# Patient Record
Sex: MALE | Race: WHITE | NOT HISPANIC OR LATINO | URBAN - METROPOLITAN AREA
[De-identification: names, ages, dates, MRNs, and addresses within clinical notes are randomized per-mention and may not be internally consistent; named-entity substitution may affect disease eponyms.]

---

## 2022-10-20 ENCOUNTER — CLAIMS CREATED FROM THE CLAIM WINDOW (OUTPATIENT)
Dept: URBAN - METROPOLITAN AREA SURGERY CENTER 30 | Facility: SURGERY CENTER | Age: 67
End: 2022-10-20
Payer: MEDICARE

## 2022-10-20 ENCOUNTER — CLAIMS CREATED FROM THE CLAIM WINDOW (OUTPATIENT)
Dept: URBAN - METROPOLITAN AREA CLINIC 4 | Facility: CLINIC | Age: 67
End: 2022-10-20
Payer: MEDICARE

## 2022-10-20 ENCOUNTER — OFFICE VISIT (OUTPATIENT)
Dept: URBAN - METROPOLITAN AREA SURGERY CENTER 30 | Facility: SURGERY CENTER | Age: 67
End: 2022-10-20

## 2022-10-20 DIAGNOSIS — Z80.0 BROTHER AT YOUNG AGE FAMILY HISTORY OF COLON CANCER: ICD-10-CM

## 2022-10-20 DIAGNOSIS — D12.0 ADENOMA OF CECUM: ICD-10-CM

## 2022-10-20 DIAGNOSIS — D12.5 BENIGN NEOPLASM OF SIGMOID COLON: ICD-10-CM

## 2022-10-20 DIAGNOSIS — D12.5 ADENOMA OF SIGMOID COLON: ICD-10-CM

## 2022-10-20 PROCEDURE — 45385 COLONOSCOPY W/LESION REMOVAL: CPT | Performed by: INTERNAL MEDICINE

## 2022-10-20 PROCEDURE — 88305 TISSUE EXAM BY PATHOLOGIST: CPT | Performed by: PATHOLOGY

## 2022-10-20 PROCEDURE — 45380 COLONOSCOPY AND BIOPSY: CPT | Performed by: INTERNAL MEDICINE

## 2022-10-20 PROCEDURE — G8907 PT DOC NO EVENTS ON DISCHARG: HCPCS | Performed by: INTERNAL MEDICINE

## 2022-11-15 ENCOUNTER — WEB ENCOUNTER (OUTPATIENT)
Dept: URBAN - METROPOLITAN AREA CLINIC 40 | Facility: CLINIC | Age: 67
End: 2022-11-15

## 2024-01-22 ENCOUNTER — OFFICE VISIT (OUTPATIENT)
Dept: URBAN - METROPOLITAN AREA CLINIC 40 | Facility: CLINIC | Age: 69
End: 2024-01-22
Payer: MEDICARE

## 2024-01-22 VITALS
SYSTOLIC BLOOD PRESSURE: 132 MMHG | TEMPERATURE: 97 F | HEIGHT: 66 IN | DIASTOLIC BLOOD PRESSURE: 82 MMHG | WEIGHT: 176.2 LBS | HEART RATE: 84 BPM | BODY MASS INDEX: 28.32 KG/M2

## 2024-01-22 DIAGNOSIS — R13.19 ESOPHAGEAL DYSPHAGIA: ICD-10-CM

## 2024-01-22 DIAGNOSIS — K21.9 GERD WITHOUT ESOPHAGITIS: ICD-10-CM

## 2024-01-22 PROCEDURE — 99214 OFFICE O/P EST MOD 30 MIN: CPT | Performed by: INTERNAL MEDICINE

## 2024-01-22 RX ORDER — LATANOPROST 50 UG/ML
INSTILL ONE DROP INTO EACH EYE ONE TIME DAILY AT BEDTIME SOLUTION OPHTHALMIC
Qty: 7.5 UNSPECIFIED | Refills: 0 | Status: ACTIVE | COMMUNITY

## 2024-01-22 RX ORDER — DORZOLAMIDE HYDROCHLORIDE AND TIMOLOL MALEATE 20; 5 MG/ML; MG/ML
INSTILL ONE DROP INTO EACH EYE TWICE DAILY SOLUTION OPHTHALMIC
Qty: 10 UNSPECIFIED | Refills: 0 | Status: ACTIVE | COMMUNITY

## 2024-01-22 RX ORDER — MUPIROCIN 20 MG/G
OINTMENT TOPICAL
Qty: 22 GRAM | Status: ACTIVE | COMMUNITY

## 2024-01-22 RX ORDER — MELOXICAM 15 MG/1
TAKE ONE TABLET BY MOUTH ONE TIME DAILY AS NEEDED FOR PAIN TABLET ORAL
Qty: 90 UNSPECIFIED | Refills: 1 | Status: ACTIVE | COMMUNITY

## 2024-01-22 RX ORDER — ERYTHROMYCIN 5 MG/G
OINTMENT OPHTHALMIC
Qty: 3 UNSPECIFIED | Status: ON HOLD | COMMUNITY

## 2024-01-22 RX ORDER — LISINOPRIL 10 MG/1
TAKE ONE TABLET BY MOUTH ONE TIME DAILY TABLET ORAL
Qty: 90 UNSPECIFIED | Refills: 1 | Status: ACTIVE | COMMUNITY

## 2024-01-22 NOTE — PHYSICAL EXAM PSYCH:
normal mood with appropriate affect Continue Regimen: Desonide as directed prn flares Detail Level: Detailed Initiate Treatment: Cerave healing ointment Plan: If condition does not clear & stay clear will do patch testing.  Booked already, patient will cancel if he stays clear. Otc Regimen: Cerave cleanser, Cetaphil for cleansing

## 2024-01-22 NOTE — HPI-TODAY'S VISIT:
Pt describes long history of atypical reflux symptoms, months. Quality is a burning regurgition. NotedSymptoms a few months ago after he had a spinal ablation, while being put to sleep he had regurgitation and aspirated.  He was told he likely had a hiatal hernia and should see GI for evaluation.  Since that time he has had some mucus and sinus drainage.  He took over-the-counter acid reducer and it did help significantly with much less drainage.  He also describes occasional solid food dysphagia.  This seems to be worsening over the past weeks to months.  Sometimes food sticks and is uncomfortable in the lower esophagus for seconds at a time.Colonoscopy 2022 here, 2 small polyps removed, otherwise up-to-date on colonoscopy.  Not seen prior for GERD here.

## 2024-01-23 ENCOUNTER — LAB OUTSIDE AN ENCOUNTER (OUTPATIENT)
Dept: URBAN - METROPOLITAN AREA CLINIC 40 | Facility: CLINIC | Age: 69
End: 2024-01-23

## 2024-01-23 PROBLEM — 266435005: Status: ACTIVE | Noted: 2024-01-23

## 2024-03-07 ENCOUNTER — LAB (OUTPATIENT)
Dept: URBAN - METROPOLITAN AREA CLINIC 4 | Facility: CLINIC | Age: 69
End: 2024-03-07
Payer: MEDICARE

## 2024-03-07 ENCOUNTER — EGD W/ DIL (OUTPATIENT)
Dept: URBAN - METROPOLITAN AREA SURGERY CENTER 30 | Facility: SURGERY CENTER | Age: 69
End: 2024-03-07
Payer: MEDICARE

## 2024-03-07 DIAGNOSIS — K21.9 ACID REFLUX: ICD-10-CM

## 2024-03-07 DIAGNOSIS — K29.80 ACUTE DUODENITIS: ICD-10-CM

## 2024-03-07 DIAGNOSIS — K31.89 OTHER DISEASES OF STOMACH AND DUODENUM: ICD-10-CM

## 2024-03-07 DIAGNOSIS — R13.19 CERVICAL DYSPHAGIA: ICD-10-CM

## 2024-03-07 PROCEDURE — 88305 TISSUE EXAM BY PATHOLOGIST: CPT | Performed by: PATHOLOGY

## 2024-03-07 PROCEDURE — 43239 EGD BIOPSY SINGLE/MULTIPLE: CPT | Performed by: INTERNAL MEDICINE

## 2024-03-07 PROCEDURE — 88312 SPECIAL STAINS GROUP 1: CPT | Performed by: PATHOLOGY

## 2024-03-07 PROCEDURE — 43450 DILATE ESOPHAGUS 1/MULT PASS: CPT | Performed by: INTERNAL MEDICINE

## 2024-03-07 RX ORDER — PANTOPRAZOLE SODIUM 40 MG/1
1 TABLET TABLET, DELAYED RELEASE ORAL ONCE A DAY
Qty: 90 TABLET | Refills: 3 | OUTPATIENT
Start: 2024-03-07

## 2024-03-07 RX ORDER — ERYTHROMYCIN 5 MG/G
OINTMENT OPHTHALMIC
Qty: 3 UNSPECIFIED | Status: ON HOLD | COMMUNITY

## 2024-03-07 RX ORDER — MELOXICAM 15 MG/1
TAKE ONE TABLET BY MOUTH ONE TIME DAILY AS NEEDED FOR PAIN TABLET ORAL
Qty: 90 UNSPECIFIED | Refills: 1 | Status: ACTIVE | COMMUNITY

## 2024-03-07 RX ORDER — LATANOPROST 50 UG/ML
INSTILL ONE DROP INTO EACH EYE ONE TIME DAILY AT BEDTIME SOLUTION OPHTHALMIC
Qty: 7.5 UNSPECIFIED | Refills: 0 | Status: ACTIVE | COMMUNITY

## 2024-03-07 RX ORDER — DORZOLAMIDE HYDROCHLORIDE AND TIMOLOL MALEATE 20; 5 MG/ML; MG/ML
INSTILL ONE DROP INTO EACH EYE TWICE DAILY SOLUTION OPHTHALMIC
Qty: 10 UNSPECIFIED | Refills: 0 | Status: ACTIVE | COMMUNITY

## 2024-03-07 RX ORDER — MUPIROCIN 20 MG/G
OINTMENT TOPICAL
Qty: 22 GRAM | Status: ACTIVE | COMMUNITY

## 2024-03-07 RX ORDER — LISINOPRIL 10 MG/1
TAKE ONE TABLET BY MOUTH ONE TIME DAILY TABLET ORAL
Qty: 90 UNSPECIFIED | Refills: 1 | Status: ACTIVE | COMMUNITY

## 2024-04-12 ENCOUNTER — OV EP (OUTPATIENT)
Dept: URBAN - METROPOLITAN AREA CLINIC 40 | Facility: CLINIC | Age: 69
End: 2024-04-12
Payer: MEDICARE

## 2024-04-12 VITALS
WEIGHT: 179.3 LBS | SYSTOLIC BLOOD PRESSURE: 128 MMHG | HEART RATE: 80 BPM | BODY MASS INDEX: 28.82 KG/M2 | HEIGHT: 66 IN | DIASTOLIC BLOOD PRESSURE: 76 MMHG | TEMPERATURE: 97 F

## 2024-04-12 DIAGNOSIS — K44.9 HIATAL HERNIA: ICD-10-CM

## 2024-04-12 DIAGNOSIS — K29.80 DUODENITIS: ICD-10-CM

## 2024-04-12 DIAGNOSIS — K21.00 GASTROESOPHAGEAL REFLUX DISEASE WITH ESOPHAGITIS WITHOUT HEMORRHAGE: ICD-10-CM

## 2024-04-12 PROBLEM — 72007001: Status: ACTIVE | Noted: 2024-04-12

## 2024-04-12 PROBLEM — 266433003: Status: ACTIVE | Noted: 2024-04-12

## 2024-04-12 PROCEDURE — 99213 OFFICE O/P EST LOW 20 MIN: CPT | Performed by: NURSE PRACTITIONER

## 2024-04-12 RX ORDER — LATANOPROST 50 UG/ML
INSTILL ONE DROP INTO EACH EYE ONE TIME DAILY AT BEDTIME SOLUTION OPHTHALMIC
Qty: 7.5 UNSPECIFIED | Refills: 0 | Status: ACTIVE | COMMUNITY

## 2024-04-12 RX ORDER — PANTOPRAZOLE SODIUM 40 MG/1
1 TABLET TABLET, DELAYED RELEASE ORAL ONCE A DAY
Qty: 90 TABLET | Refills: 3 | Status: ACTIVE | COMMUNITY
Start: 2024-03-07

## 2024-04-12 RX ORDER — LISINOPRIL 10 MG/1
TAKE ONE TABLET BY MOUTH ONE TIME DAILY TABLET ORAL
Qty: 90 UNSPECIFIED | Refills: 1 | Status: ACTIVE | COMMUNITY

## 2024-04-12 RX ORDER — ERYTHROMYCIN 5 MG/G
OINTMENT OPHTHALMIC
Qty: 3 UNSPECIFIED | Status: ACTIVE | COMMUNITY

## 2024-04-12 RX ORDER — MELOXICAM 15 MG/1
TAKE ONE TABLET BY MOUTH ONE TIME DAILY AS NEEDED FOR PAIN TABLET ORAL
Qty: 90 UNSPECIFIED | Refills: 1 | Status: ACTIVE | COMMUNITY

## 2024-04-12 RX ORDER — DORZOLAMIDE HYDROCHLORIDE AND TIMOLOL MALEATE 20; 5 MG/ML; MG/ML
INSTILL ONE DROP INTO EACH EYE TWICE DAILY SOLUTION OPHTHALMIC
Qty: 10 UNSPECIFIED | Refills: 0 | Status: ACTIVE | COMMUNITY

## 2024-04-12 RX ORDER — MUPIROCIN 20 MG/G
OINTMENT TOPICAL
Qty: 22 GRAM | Status: ACTIVE | COMMUNITY

## 2024-04-12 NOTE — HPI-TODAY'S VISIT:
68-year-old male patient with past medical history as listed below, known to Dr. Duggan.   Last office visit January 22, 2024 for long history of reflux symptoms, burning and regurgitation ,worse past few months after spinal ablation.  Was taking over-the-counter acid reducer.  Occasional solid food dysphagia.  Colonoscopy 2022,  2 small polyps removed.  Sinus drainage unlikely to be associated with GERD.  EGD dilation was ordered, no medications prescribed.  ---March 7, 2024 EGD.  Small hiatal hernia present.  LA grade B esophagitis with no bleeding.  Biopsies taken.  No endoscopic abnormality evident in the esophagus to explain complaint of dysphagia.  Empiric dilation done no resistance 56 [place] Colbert dilator.  Diffuse mild inflammation characterized by congestion, erosions, erythema and friability in the gastric antrum.  Diffuse moderate inflammation characterized by congestion, erosions, erythema and friability found in the duodenal bulb use Protonix 40 mg daily.  Gastritis, duodenitis LA grade B reflux esophagitis. No abnormality on stomach antrum and body biopsy.  Squamous mucosa with reflux type changes on esophagus biopsy, no Javier's or EOE.  -- The patient presents today for EGD follow-up.  He states the pantoprazole makes him a little nauseated.  Discussed that when he is finished with this bottle if he is still nauseated we can try switching him to a different PPI ,such as omeprazole. He states he has scoliosis and degenerative disc disease and that surgery is wanting to put rods in his back, but he does not want to.  He questions if he has to go on some type of pain medication how will affect his stomach. Explained NSAIDs could cause more erosions in the stomach and we would potentially put him on Carafate to help with that. Otherwise he is doing well and all questions were answered.